# Patient Record
Sex: FEMALE | HISPANIC OR LATINO
[De-identification: names, ages, dates, MRNs, and addresses within clinical notes are randomized per-mention and may not be internally consistent; named-entity substitution may affect disease eponyms.]

---

## 2017-07-11 ENCOUNTER — CHARTING TRANS (OUTPATIENT)
Dept: OTHER | Age: 37
End: 2017-07-11

## 2017-07-11 ENCOUNTER — LAB SERVICES (OUTPATIENT)
Dept: OTHER | Age: 37
End: 2017-07-11

## 2017-07-11 LAB — RAPID STREP GROUP A: NORMAL

## 2017-09-15 ENCOUNTER — CHARTING TRANS (OUTPATIENT)
Dept: OTHER | Age: 37
End: 2017-09-15

## 2017-10-30 ENCOUNTER — LAB SERVICES (OUTPATIENT)
Dept: OTHER | Age: 37
End: 2017-10-30

## 2017-10-30 ENCOUNTER — CHARTING TRANS (OUTPATIENT)
Dept: OTHER | Age: 37
End: 2017-10-30

## 2017-10-30 LAB
APPEARANCE: NORMAL
BILIRUBIN: NORMAL
COLOR: YELLOW
GLUCOSE U: NORMAL
KETONES: NORMAL
LEUKOCYTE ESTERASE: NORMAL
NITRITE: NORMAL
OCCULT BLOOD: NORMAL
PH: 5.5
PROTEIN: NORMAL
URINE SPEC GRAVITY: 1.02
UROBILINOGEN: 0.2

## 2017-10-30 ASSESSMENT — PAIN SCALES - GENERAL: PAINLEVEL_OUTOF10: 5

## 2017-11-24 ENCOUNTER — HOSPITAL (OUTPATIENT)
Dept: OTHER | Age: 37
End: 2017-11-24
Attending: FAMILY MEDICINE

## 2018-01-15 ENCOUNTER — CHARTING TRANS (OUTPATIENT)
Dept: OTHER | Age: 38
End: 2018-01-15

## 2018-01-15 ENCOUNTER — LAB SERVICES (OUTPATIENT)
Dept: OTHER | Age: 38
End: 2018-01-15

## 2018-01-15 ENCOUNTER — MYAURORA ACCOUNT LINK (OUTPATIENT)
Dept: OTHER | Age: 38
End: 2018-01-15

## 2018-01-16 LAB
APPEARANCE: CLEAR
BILIRUBIN: NORMAL
COLOR: YELLOW
GLUCOSE U: NORMAL
KETONES: NORMAL
LEUKOCYTE ESTERASE: NORMAL
NITRITE: NORMAL
OCCULT BLOOD: NORMAL
PH: 6
PROTEIN: NORMAL
URINE SPEC GRAVITY: 1.02
UROBILINOGEN: 1

## 2018-01-23 ENCOUNTER — CHARTING TRANS (OUTPATIENT)
Dept: OTHER | Age: 38
End: 2018-01-23

## 2018-01-23 LAB — PAP WITH HIGH RISK HPV: NORMAL

## 2018-11-02 VITALS
DIASTOLIC BLOOD PRESSURE: 80 MMHG | RESPIRATION RATE: 16 BRPM | OXYGEN SATURATION: 98 % | HEART RATE: 88 BPM | TEMPERATURE: 98.9 F | SYSTOLIC BLOOD PRESSURE: 130 MMHG | WEIGHT: 256.52 LBS

## 2018-11-02 VITALS
SYSTOLIC BLOOD PRESSURE: 133 MMHG | DIASTOLIC BLOOD PRESSURE: 92 MMHG | OXYGEN SATURATION: 100 % | BODY MASS INDEX: 41.29 KG/M2 | WEIGHT: 233 LBS | TEMPERATURE: 99.1 F | HEIGHT: 63 IN | RESPIRATION RATE: 16 BRPM | HEART RATE: 84 BPM

## 2018-11-02 VITALS
HEIGHT: 63 IN | RESPIRATION RATE: 17 BRPM | WEIGHT: 248 LBS | BODY MASS INDEX: 43.94 KG/M2 | HEART RATE: 74 BPM | TEMPERATURE: 98.2 F

## 2018-11-03 VITALS
DIASTOLIC BLOOD PRESSURE: 82 MMHG | RESPIRATION RATE: 18 BRPM | SYSTOLIC BLOOD PRESSURE: 110 MMHG | TEMPERATURE: 98.2 F | HEART RATE: 77 BPM | WEIGHT: 250 LBS | HEIGHT: 63 IN | BODY MASS INDEX: 44.3 KG/M2 | OXYGEN SATURATION: 98 %

## 2019-04-03 ENCOUNTER — WALK IN (OUTPATIENT)
Dept: URGENT CARE | Age: 39
End: 2019-04-03

## 2019-04-03 DIAGNOSIS — H69.93 DYSFUNCTION OF BOTH EUSTACHIAN TUBES: Primary | ICD-10-CM

## 2019-04-03 PROCEDURE — 99213 OFFICE O/P EST LOW 20 MIN: CPT | Performed by: NURSE PRACTITIONER

## 2019-04-03 ASSESSMENT — ENCOUNTER SYMPTOMS
EYE DISCHARGE: 1
FEVER: 0
SORE THROAT: 0
COUGH: 1
EYE ITCHING: 1
EYE REDNESS: 0
RHINORRHEA: 1

## 2019-04-03 ASSESSMENT — PAIN SCALES - GENERAL: PAINLEVEL: 5-6

## 2020-03-22 ENCOUNTER — TELEPHONE (OUTPATIENT)
Dept: FAMILY MEDICINE | Age: 40
End: 2020-03-22

## 2020-03-22 ENCOUNTER — TELEPHONE (OUTPATIENT)
Dept: SCHEDULING | Age: 40
End: 2020-03-22

## 2021-05-26 VITALS
SYSTOLIC BLOOD PRESSURE: 122 MMHG | HEART RATE: 93 BPM | TEMPERATURE: 98.6 F | DIASTOLIC BLOOD PRESSURE: 86 MMHG | HEIGHT: 63 IN | WEIGHT: 239 LBS | RESPIRATION RATE: 16 BRPM | BODY MASS INDEX: 42.35 KG/M2 | OXYGEN SATURATION: 98 %

## 2022-07-08 ENCOUNTER — NURSE ONLY (OUTPATIENT)
Dept: INTERNAL MEDICINE CLINIC | Facility: HOSPITAL | Age: 42
End: 2022-07-08
Attending: EMERGENCY MEDICINE

## 2022-07-08 DIAGNOSIS — Z00.00 WELLNESS EXAMINATION: Primary | ICD-10-CM

## 2022-07-08 LAB
RUBV IGG SER QL: POSITIVE
RUBV IGG SER-ACNC: 151.6 IU/ML (ref 10–?)

## 2022-07-08 PROCEDURE — 86765 RUBEOLA ANTIBODY: CPT

## 2022-07-08 PROCEDURE — 86480 TB TEST CELL IMMUN MEASURE: CPT

## 2022-07-08 PROCEDURE — 86762 RUBELLA ANTIBODY: CPT

## 2022-07-08 PROCEDURE — 86787 VARICELLA-ZOSTER ANTIBODY: CPT

## 2022-07-08 PROCEDURE — 86735 MUMPS ANTIBODY: CPT

## 2022-07-11 LAB
M TB IFN-G CD4+ T-CELLS BLD-ACNC: 0.71 IU/ML
M TB TUBERC IFN-G BLD QL: NEGATIVE
M TB TUBERC IGNF/MITOGEN IGNF CONTROL: >10 IU/ML
MEV IGG SER-ACNC: 149 AU/ML (ref 16.5–?)
MUV IGG SER IA-ACNC: 16.1 AU/ML (ref 11–?)
QFT TB1 AG MINUS NIL: -0.45 IU/ML
QFT TB2 AG MINUS NIL: -0.45 IU/ML
VZV IGG SER IA-ACNC: 1420 (ref 165–?)

## 2022-09-19 ENCOUNTER — HOSPITAL ENCOUNTER (OUTPATIENT)
Dept: MAMMOGRAPHY | Facility: HOSPITAL | Age: 42
Discharge: HOME | End: 2022-09-19

## 2022-09-19 DIAGNOSIS — Z12.39 ENCOUNTER FOR OTHER SCREENING FOR MALIGNANT NEOPLASM OF BREAST: ICD-10-CM

## 2022-09-19 DIAGNOSIS — Z12.31 ENCOUNTER FOR SCREENING MAMMOGRAM FOR MALIGNANT NEOPLASM OF BREAST: ICD-10-CM

## 2022-09-19 PROCEDURE — 77067 SCR MAMMO BI INCL CAD: CPT | Performed by: FAMILY MEDICINE

## 2022-09-19 PROCEDURE — 77063 BREAST TOMOSYNTHESIS BI: CPT | Performed by: FAMILY MEDICINE

## 2022-10-24 ENCOUNTER — OFFICE VISIT (OUTPATIENT)
Dept: FAMILY MEDICINE CLINIC | Facility: CLINIC | Age: 42
End: 2022-10-24
Payer: COMMERCIAL

## 2022-10-24 VITALS
WEIGHT: 252 LBS | SYSTOLIC BLOOD PRESSURE: 128 MMHG | DIASTOLIC BLOOD PRESSURE: 88 MMHG | HEART RATE: 83 BPM | BODY MASS INDEX: 43.02 KG/M2 | TEMPERATURE: 98 F | HEIGHT: 64 IN | OXYGEN SATURATION: 97 % | RESPIRATION RATE: 16 BRPM

## 2022-10-24 DIAGNOSIS — J06.9 VIRAL URI: Primary | ICD-10-CM

## 2022-10-24 DIAGNOSIS — H65.02 NON-RECURRENT ACUTE SEROUS OTITIS MEDIA OF LEFT EAR: ICD-10-CM

## 2022-10-24 LAB
POCT LOT NUMBER: NORMAL
RAPID SARS-COV-2 BY PCR: NOT DETECTED

## 2022-10-24 RX ORDER — LIRAGLUTIDE 6 MG/ML
INJECTION, SOLUTION SUBCUTANEOUS
COMMUNITY
Start: 2022-09-06

## 2022-11-04 ENCOUNTER — IMMUNIZATION (OUTPATIENT)
Dept: LAB | Facility: HOSPITAL | Age: 42
End: 2022-11-04
Attending: PREVENTIVE MEDICINE
Payer: COMMERCIAL

## 2022-11-04 DIAGNOSIS — Z23 NEED FOR VACCINATION: Primary | ICD-10-CM

## 2022-11-04 PROCEDURE — 0124A SARSCOV2 VAC BVL 30MCG/0.3ML: CPT

## 2022-11-04 PROCEDURE — 90471 IMMUNIZATION ADMIN: CPT

## 2023-06-26 DIAGNOSIS — G56.03 BILATERAL CARPAL TUNNEL SYNDROME: Primary | ICD-10-CM

## 2024-01-09 ENCOUNTER — E-ADVICE (OUTPATIENT)
Dept: OTHER | Age: 44
End: 2024-01-09

## 2024-03-26 ENCOUNTER — HOSPITAL ENCOUNTER (OUTPATIENT)
Age: 44
Discharge: HOME | End: 2024-03-26
Payer: COMMERCIAL

## 2024-03-26 ENCOUNTER — APPOINTMENT (OUTPATIENT)
Dept: GENERAL RADIOLOGY | Age: 44
End: 2024-03-26
Attending: NURSE PRACTITIONER
Payer: COMMERCIAL

## 2024-03-26 VITALS
TEMPERATURE: 98 F | SYSTOLIC BLOOD PRESSURE: 144 MMHG | DIASTOLIC BLOOD PRESSURE: 89 MMHG | HEART RATE: 80 BPM | RESPIRATION RATE: 14 BRPM | OXYGEN SATURATION: 100 %

## 2024-03-26 DIAGNOSIS — J98.01 BRONCHOSPASM: ICD-10-CM

## 2024-03-26 DIAGNOSIS — B97.89 VIRAL RESPIRATORY ILLNESS: Primary | ICD-10-CM

## 2024-03-26 DIAGNOSIS — J98.8 VIRAL RESPIRATORY ILLNESS: Primary | ICD-10-CM

## 2024-03-26 LAB
POCT INFLUENZA A: NEGATIVE
POCT INFLUENZA B: NEGATIVE
SARS-COV-2 RNA RESP QL NAA+PROBE: NOT DETECTED

## 2024-03-26 PROCEDURE — 87502 INFLUENZA DNA AMP PROBE: CPT | Performed by: NURSE PRACTITIONER

## 2024-03-26 PROCEDURE — 99204 OFFICE O/P NEW MOD 45 MIN: CPT | Performed by: NURSE PRACTITIONER

## 2024-03-26 PROCEDURE — 71046 X-RAY EXAM CHEST 2 VIEWS: CPT | Performed by: NURSE PRACTITIONER

## 2024-03-26 PROCEDURE — U0002 COVID-19 LAB TEST NON-CDC: HCPCS | Performed by: NURSE PRACTITIONER

## 2024-03-26 RX ORDER — BENZONATATE 100 MG/1
100 CAPSULE ORAL 3 TIMES DAILY PRN
Qty: 20 CAPSULE | Refills: 0 | Status: SHIPPED | OUTPATIENT
Start: 2024-03-26 | End: 2024-04-02

## 2024-03-26 RX ORDER — PREDNISONE 20 MG/1
40 TABLET ORAL DAILY
Qty: 10 TABLET | Refills: 0 | Status: SHIPPED | OUTPATIENT
Start: 2024-03-26 | End: 2024-03-31

## 2024-03-26 RX ORDER — ALBUTEROL SULFATE 90 UG/1
2 AEROSOL, METERED RESPIRATORY (INHALATION) EVERY 4 HOURS PRN
Qty: 8.5 EACH | Refills: 0 | Status: SHIPPED | OUTPATIENT
Start: 2024-03-26 | End: 2024-04-25

## 2024-03-26 NOTE — ED INITIAL ASSESSMENT (HPI)
Pt c/o congestion, cough x6 days, L ear pressure, sore throat x5 days, pain to R lateral area of breast x4 days.  No falls or injury.  No fever.

## 2024-03-26 NOTE — ED PROVIDER NOTES
Patient Seen in: [facility]    History   CC: cough  HPI: Kaitlin Elizalde 43 year old female  who presents c/o cough, congestion, sore throat, ear pressure, fatigue x5 days. Denies fever, rash, fabio, cp, gi s/s, rash. Difficulty sleeping at night d/t cough and wheezing per pt.     History reviewed. No pertinent past medical history.    History reviewed. No pertinent surgical history.    No family history on file.    Social History     Socioeconomic History    Marital status: Single   Tobacco Use    Smoking status: Never    Smokeless tobacco: Never   Vaping Use    Vaping Use: Never used   Substance and Sexual Activity    Alcohol use: Yes     Comment: occ    Drug use: Never       ROS:  Review of Systems    Positive for stated complaint: cough  Other systems are as noted in HPI.  Constitutional and vital signs reviewed.      All other systems reviewed and negative except as noted above.    PSFH elements reviewed from today and agreed except as otherwise stated in HPI.             Constitutional and vital signs reviewed.        Physical Exam     ED Triage Vitals [03/26/24 0820]   /89   Pulse 80   Resp 14   Temp 98.4 °F (36.9 °C)   Temp src Oral   SpO2 100 %   O2 Device None (Room air)       Current:/89   Pulse 80   Temp 98.4 °F (36.9 °C) (Oral)   Resp 14   LMP 03/05/2024   SpO2 100%         PE:  General - Appears well, non-toxic and in NAD  Head - Appears symmetrical without deformity/swelling cranium, scalp, or facial bones  Eyes - sclera not injected, no discharge noted, no periorbital edema  ENT - EAC bilaterally without discharge, TM bilat without injection,    + clear nasal drainage noted in nares bilat, no cobblestoning to post. Pharynx. However serous otitis bilat present.  Oropharynx clear, posterior pharynx is without erythema and without tonsilar enlargement or exudate, uvula midline, +gag, voice is clear. No trismus  Neck - supple with trachea midline  Resp - Lung sounds clear  bilaterally and wob unlabored, good aeration with equal, even expansion bilaterally   CV - RRR  Skin - no rashes or petechiae noted, pink warm and dry throughout, mmm, cap refill <2seconds  Neuro - A&O x4, steady gait  MSK - makes purposeful movements of all extremities, radial pulses 2+ bilat.  Psych - Interactive and appropriate      ED Course     Labs Reviewed   POCT FLU TEST - Normal    Narrative:     This assay is a rapid molecular in vitro test utilizing nucleic acid amplification of influenza A and B viral RNA.   RAPID SARS-COV-2 BY PCR - Normal       MDM     XR CHEST PA + LAT CHEST (CPT=71046) (Final result)  Result time 03/26/24 08:51:32  Final result by Deon Larkin MD (03/26/24 08:51:32)                Impression:    CONCLUSION:  1. No acute cardiopulmonary disease           Dictated by (CST): Deon Larkin MD on 3/26/2024 at 8:46 AM      Finalized by (CST): Deon Larkin MD on 3/26/2024 at 8:51 AM                  Narrative:    PROCEDURE: XR CHEST PA + LAT CHEST (CPT=71046)     COMPARISON: None.     INDICATIONS: Productive cough, sore throat, right sided chest pain, and congestion x5 days.     TECHNIQUE:   Two views.       FINDINGS:  CARDIAC/VASC: Normal cardiac silhouette.  Unremarkable pulmonary vasculature.    MEDIAST/JOE: Tortuous aorta  LUNGS/PLEURA: No significant pulmonary parenchymal abnormalities.  No effusion or pleural thickening.    BONES: Mild scoliosis with mild degenerative disc disease and spondylosis.    OTHER: Negative.                   DDx: covid, flu, pneumonia, unspecified viral illness    Chest x-ray as noted above without acute process and independently reviewed by this provider.  Influenza negative.  Rapid COVID PCR negative.  Results discussed with patient as well as general viral illness instructions, rest, hydration instructions, Tylenol or Motrin as needed for discomfort, prescriptions as written as well as indications/precautions on use reviewed,  follow-up and return/ED precautions reviewed.  Bronchospasm instructions reviewed.  Patient is historian and demonstrates understanding of all instruction and agrees with plan of care.      Disposition and Plan     Clinical Impression:  1. Viral respiratory illness    2. Bronchospasm        Disposition:  Discharge    Follow-up:  John Myers MD  [address] [place] [id number]  [phone]    Schedule an appointment as soon as possible for a visit in 2 days        Medications Prescribed:  Current Discharge Medication List        START taking these medications    Details   albuterol 108 (90 Base) MCG/ACT Inhalation Aero Soln Inhale 2 puffs into the lungs every 4 (four) hours as needed for Wheezing or Shortness of Breath (spastic cough).  Qty: 8.5 each, Refills: 0      Spacer/Aero-Holding Chambers Does not apply Device Spacer for use with Albuterol Inhaler  Qty: 1 each, Refills: 0      predniSONE 20 MG Oral Tab Take 2 tablets (40 mg total) by mouth daily for 5 days.  Qty: 10 tablet, Refills: 0      benzonatate 100 MG Oral Cap Take 1 capsule (100 mg total) by mouth 3 (three) times daily as needed for cough.  Qty: 20 capsule, Refills: 0

## 2024-07-24 ENCOUNTER — TELEPHONE (OUTPATIENT)
Dept: PODIATRY CLINIC | Facility: CLINIC | Age: 44
End: 2024-07-24

## 2024-07-24 NOTE — TELEPHONE ENCOUNTER
Called patient and offered her appointment on 7/26 at 230pm with Dr Prather at [facility] for left foot pain. She had no further concerns.

## 2024-07-24 NOTE — TELEPHONE ENCOUNTER
Patient calling wanting to schedule with Dr. Prather for foot pain. Patient is aware that Dr. Prather will be out on maternity leave soon and is not taking anymore surgical cases at this time. Patient expressed verbal understanding.   Dr. Prather has given the okay to schedule her into her schedule. Please call and schedule appointment.

## 2024-07-26 ENCOUNTER — PATIENT MESSAGE (OUTPATIENT)
Dept: PODIATRY CLINIC | Facility: CLINIC | Age: 44
End: 2024-07-26

## 2024-07-26 ENCOUNTER — TELEPHONE (OUTPATIENT)
Dept: PODIATRY CLINIC | Facility: CLINIC | Age: 44
End: 2024-07-26

## 2024-07-26 ENCOUNTER — OFFICE VISIT (OUTPATIENT)
Dept: PODIATRY CLINIC | Facility: CLINIC | Age: 44
End: 2024-07-26
Payer: COMMERCIAL

## 2024-07-26 DIAGNOSIS — M76.822 POSTERIOR TIBIAL TENDINITIS, LEFT: Primary | ICD-10-CM

## 2024-07-26 PROCEDURE — 99203 OFFICE O/P NEW LOW 30 MIN: CPT | Performed by: STUDENT IN AN ORGANIZED HEALTH CARE EDUCATION/TRAINING PROGRAM

## 2024-07-26 NOTE — TELEPHONE ENCOUNTER
Called patient and appointment scheduled on 8/27 at 1pm at [facility] with Dr Prather.

## 2024-07-26 NOTE — PROGRESS NOTES
[facility] Podiatry  Progress Note      Kaitlin Elizalde is a 44 year old female.   Chief Complaint   Patient presents with    Foot Pain     Left - onset 1.5 mo ago - no injury - she just felt a piercing pain - pain got worse with dancing last week - no x-rays - rates pain as 8/10 5-8/10 on and off - has pain in the heel towards her arch          HPI:     Patient is a pleasant 44-year-old female presents to clinic for evaluation of left midfoot pain.  Admits that the pain is sharp in nature.  Relates that the pain got worse after she went dancing last week.  She does not walk barefoot and she uses supportive shoes.  Denies any other pedal injuries or trauma.    Allergies: Patient has no known allergies.    Current Outpatient Medications   Medication Sig Dispense Refill    Spacer/Aero-Holding Chambers Does not apply Device Spacer for use with Albuterol Inhaler 1 each 0    SAXENDA 18 MG/3ML Subcutaneous Solution Pen-injector inject 0.5/half mL below the skin daily (Patient not taking: Reported on 3/26/2024)        History reviewed. No pertinent past medical history.   History reviewed. No pertinent surgical history.   History reviewed. No pertinent family history.   Social History     Socioeconomic History    Marital status: Single   Tobacco Use    Smoking status: Never    Smokeless tobacco: Never   Vaping Use    Vaping status: Never Used   Substance and Sexual Activity    Alcohol use: Yes     Comment: occ    Drug use: Never           REVIEW OF SYSTEMS:     Denies nause, fever, chills  No calf pain  Denies chest pain or SOB      EXAM:   [facility] 03/05/2024   GENERAL: well developed, well nourished, in no apparent distress  EXTREMITIES:   1. Integument: Normal skin temperature and turgor  2. Vascular: Dorsalis pedis two out of four bilateral and posterior tibial pulses two out of   four bilateral, capillary refill normal.   3. Musculoskeletal: All muscle groups are graded 5 out of 5 in the foot and ankle.  Pain with left  single heel rise.  Tenderness with palpation along the posterior tibial tendon insertion   4. Neurological: Normal sharp dull sensation; reflexes normal.             ASSESSMENT AND PLAN:   Diagnoses and all orders for this visit:    Posterior tibial tendinitis, left        Plan:     Patient seen and examined and findings discussed with patient.  Informed patient that she is experiencing posterior tibial tendinitis.  I recommend using a frozen water bottle to roll on the plantar aspect of the foot while seated.  Take oral Medrol Dosepak as instructed.  Use supportive shoes even around the house and avoid walking barefoot.  Return to clinic for follow-up in 4 weeks.    The patient indicates understanding of these issues and agrees to the plan.        Patricia Prather DPM

## 2024-07-26 NOTE — TELEPHONE ENCOUNTER
Pt saw Dr. Prather on 7/26/24. Needs a follow up in 4 weeks. Please advise with appt as scheduled is blocked

## 2024-07-29 NOTE — TELEPHONE ENCOUNTER
Note from 7/26/24 states that she should take oral medrol as instructed. No medication placed for patient.    Please advise on Rx for patient

## 2024-07-29 NOTE — TELEPHONE ENCOUNTER
From: Kaitlin Elizalde  To: Patricia Prather  Sent: 7/26/2024 4:06 PM CDT  Subject: Prescription     Hello! I wasn't sure if I was supposed to obtain an actual prescription or if it would be ordered? My pharmacy has no record of a prescription being sent.    Thanks,  Kaitlin

## 2024-07-30 RX ORDER — METHYLPREDNISOLONE 4 MG/1
TABLET ORAL
Qty: 21 TABLET | Refills: 0 | Status: SHIPPED | OUTPATIENT
Start: 2024-07-30

## 2024-08-30 ENCOUNTER — OFFICE VISIT (OUTPATIENT)
Dept: INTERNAL MEDICINE CLINIC | Facility: CLINIC | Age: 44
End: 2024-08-30
Payer: COMMERCIAL

## 2024-08-30 VITALS
OXYGEN SATURATION: 94 % | HEIGHT: 64 IN | BODY MASS INDEX: 45.41 KG/M2 | HEART RATE: 86 BPM | SYSTOLIC BLOOD PRESSURE: 122 MMHG | DIASTOLIC BLOOD PRESSURE: 94 MMHG | WEIGHT: 266 LBS

## 2024-08-30 DIAGNOSIS — Z12.31 SCREENING MAMMOGRAM FOR BREAST CANCER: ICD-10-CM

## 2024-08-30 DIAGNOSIS — Z00.00 ROUTINE GENERAL MEDICAL EXAMINATION AT A HEALTH CARE FACILITY: Primary | ICD-10-CM

## 2024-08-30 PROBLEM — N80.9 ENDOMETRIOSIS: Status: ACTIVE | Noted: 2024-08-30

## 2024-08-30 PROBLEM — E66.9 OBESITY: Status: ACTIVE | Noted: 2024-08-30

## 2024-08-30 RX ORDER — TIRZEPATIDE 2.5 MG/.5ML
2.5 INJECTION, SOLUTION SUBCUTANEOUS WEEKLY
Qty: 2 ML | Refills: 0 | Status: SHIPPED | OUTPATIENT
Start: 2024-08-30 | End: 2024-09-21

## 2024-08-30 NOTE — PROGRESS NOTES
Subjective:   Kaitlin Elizalde is a 44 year old female who presents for Establish Care and Physical     Presents for new patient physical       PMHx  Endometriosis - had surgery at age 20 and had an ovarian cyst      has a 14 year old son and was pre-diabetic after the birth     Has gained a lot of weight in the past few years and joints are hurting   Previous doctor had her on saxenda for weight loss but then insurance stopped covering it - it was working and was losing some weight   Son has a full day of activities so she has a hard time being able to exercise   Trying to walk more - at work - 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[place] so does spend most of her day at a desk but also walks during the day  Does not eat fast food but grabs a granola bar  Usually home cooked meals for dinner   Trying to avoid sweets     No smoking or drug use   Alcohol about once a week - selter vodka drinks about a 12 pack at a time  Has been cutting down on this     Takes multivitamin    Periods are irregular   Has cycle for 7 days and then just spots the next month   Has severe cramping and heavy bleeding the first day or two   Has never been anemic with menstrual cycles     Thinks she had her pap in  which was normal    Mammogram 2 years ago was normal     Not sure when her last tetanus shot was done     History/Other:    Chief Complaint Reviewed and Verified  No Further Nursing Notes to   Review  Tobacco Reviewed  Allergies Reviewed  Medications Reviewed    Problem List Reviewed  Medical History Reviewed  Surgical History   Reviewed  OB Status Reviewed  Family History Reviewed         Tobacco:  She has never smoked tobacco.    Current Outpatient Medications   Medication Sig Dispense Refill    Tirzepatide-Weight Management (ZEPBOUND) 2.5 MG/0.5ML Subcutaneous Solution Auto-injector Inject 2.5 mg into the skin once a week for 4 doses. 2 mL 0         Review of Systems:  Review of Systems  10 point review of  systems otherwise negative with the exception of HPI and assessment and plan    Objective:   BP (!) 122/94   Pulse 86   Ht 5' 4\" (1.626 m)   Wt 266 lb (120.7 kg)   LMP 08/13/2024   SpO2 94%   BMI 45.66 kg/m²  Estimated body mass index is 45.66 kg/m² as calculated from the following:    Height as of this encounter: 5' 4\" (1.626 m).    Weight as of this encounter: 266 lb (120.7 kg).  Physical Exam  Vitals reviewed.   Constitutional:       General: She is not in acute distress.     Appearance: Normal appearance. She is well-developed. She is obese.   HENT:      Right Ear: Tympanic membrane normal.      Left Ear: Tympanic membrane normal.      Mouth/Throat:      Mouth: Mucous membranes are moist.      Pharynx: Oropharynx is clear.   Cardiovascular:      Rate and Rhythm: Normal rate and regular rhythm.      Heart sounds: Normal heart sounds.   Pulmonary:      Effort: Pulmonary effort is normal.      Breath sounds: Normal breath sounds.   Abdominal:      General: Bowel sounds are normal.      Palpations: Abdomen is soft.   Lymphadenopathy:      Cervical: No cervical adenopathy.   Skin:     General: Skin is warm and dry.   Neurological:      Mental Status: She is alert and oriented to person, place, and time.       Assessment & Plan:   1. Routine general medical examination at a health care facility (Primary)  -     CBC With Differential With Platelet; Future; Expected date: 08/30/2024  -     Comp Metabolic Panel (14); Future; Expected date: 08/30/2024  -     Lipid Panel; Future; Expected date: 08/30/2024  -     TSH W Reflex To Free T4; Future; Expected date: 08/30/2024  2. Screening mammogram for breast cancer  -     [facility] MARIEL 2D+3D SCREENING BILAT (CPT=77067/40574); Future; Expected date: 08/30/2024  3. BMI 45.0-49.9, adult (HCC)  -     Zepbound; Inject 2.5 mg into the skin once a week for 4 doses.  Dispense: 2 mL; Refill: 0  Discussed pros and cons of different weight loss medications. She is interested in  tirzepatide (zepbound). Discussed potential side effects. She denies personal or family history of medullary thyroid carcinoma. Will send medication and she will follow up in 4 weeks.      Will sign a release of information to obtain her records       FABBY Monet, 8/30/2024, 4:32 PM

## 2024-09-03 ENCOUNTER — PATIENT MESSAGE (OUTPATIENT)
Dept: INTERNAL MEDICINE CLINIC | Facility: CLINIC | Age: 44
End: 2024-09-03

## 2024-09-03 ENCOUNTER — LAB ENCOUNTER (OUTPATIENT)
Dept: LAB | Facility: HOSPITAL | Age: 44
End: 2024-09-03
Payer: COMMERCIAL

## 2024-09-03 DIAGNOSIS — Z00.00 PHYSICAL EXAM, ANNUAL: Primary | ICD-10-CM

## 2024-09-03 DIAGNOSIS — Z00.00 PHYSICAL EXAM, ANNUAL: ICD-10-CM

## 2024-09-03 DIAGNOSIS — Z00.00 ROUTINE GENERAL MEDICAL EXAMINATION AT A HEALTH CARE FACILITY: ICD-10-CM

## 2024-09-03 DIAGNOSIS — R73.01 ELEVATED FASTING BLOOD SUGAR: ICD-10-CM

## 2024-09-03 LAB
ALBUMIN SERPL-MCNC: 4.6 G/DL (ref 3.2–4.8)
ALBUMIN/GLOB SERPL: 1.5 {RATIO} (ref 1–2)
ALP LIVER SERPL-CCNC: 60 U/L
ALT SERPL-CCNC: 11 U/L
ANION GAP SERPL CALC-SCNC: 6 MMOL/L (ref 0–18)
AST SERPL-CCNC: 11 U/L (ref ?–34)
BASOPHILS # BLD AUTO: 0.03 X10(3) UL (ref 0–0.2)
BASOPHILS NFR BLD AUTO: 0.2 %
BILIRUB SERPL-MCNC: 1.1 MG/DL (ref 0.3–1.2)
BUN BLD-MCNC: 15 MG/DL (ref 9–23)
BUN/CREAT SERPL: 21.4 (ref 10–20)
CALCIUM BLD-MCNC: 9.6 MG/DL (ref 8.7–10.4)
CHLORIDE SERPL-SCNC: 110 MMOL/L (ref 98–112)
CHOLEST SERPL-MCNC: 276 MG/DL (ref ?–200)
CO2 SERPL-SCNC: 26 MMOL/L (ref 21–32)
CREAT BLD-MCNC: 0.7 MG/DL
DEPRECATED RDW RBC AUTO: 43.4 FL (ref 35.1–46.3)
EGFRCR SERPLBLD CKD-EPI 2021: 111 ML/MIN/1.73M2 (ref 60–?)
EOSINOPHIL # BLD AUTO: 0 X10(3) UL (ref 0–0.7)
EOSINOPHIL NFR BLD AUTO: 0 %
ERYTHROCYTE [DISTWIDTH] IN BLOOD BY AUTOMATED COUNT: 12.9 % (ref 11–15)
EST. AVERAGE GLUCOSE BLD GHB EST-MCNC: 114 MG/DL (ref 68–126)
FASTING PATIENT LIPID ANSWER: YES
FASTING STATUS PATIENT QL REPORTED: YES
GLOBULIN PLAS-MCNC: 3 G/DL (ref 2–3.5)
GLUCOSE BLD-MCNC: 106 MG/DL (ref 70–99)
HBA1C MFR BLD: 5.6 % (ref ?–5.7)
HCT VFR BLD AUTO: 43 %
HDLC SERPL-MCNC: 58 MG/DL (ref 40–59)
HGB BLD-MCNC: 14.3 G/DL
IMM GRANULOCYTES # BLD AUTO: 0.06 X10(3) UL (ref 0–1)
IMM GRANULOCYTES NFR BLD: 0.5 %
LDLC SERPL CALC-MCNC: 175 MG/DL (ref ?–100)
LYMPHOCYTES # BLD AUTO: 3.28 X10(3) UL (ref 1–4)
LYMPHOCYTES NFR BLD AUTO: 26.9 %
MCH RBC QN AUTO: 30.3 PG (ref 26–34)
MCHC RBC AUTO-ENTMCNC: 33.3 G/DL (ref 31–37)
MCV RBC AUTO: 91.1 FL
MONOCYTES # BLD AUTO: 0.66 X10(3) UL (ref 0.1–1)
MONOCYTES NFR BLD AUTO: 5.4 %
NEUTROPHILS # BLD AUTO: 8.18 X10 (3) UL (ref 1.5–7.7)
NEUTROPHILS # BLD AUTO: 8.18 X10(3) UL (ref 1.5–7.7)
NEUTROPHILS NFR BLD AUTO: 67 %
NONHDLC SERPL-MCNC: 218 MG/DL (ref ?–130)
OSMOLALITY SERPL CALC.SUM OF ELEC: 295 MOSM/KG (ref 275–295)
PLATELET # BLD AUTO: 363 10(3)UL (ref 150–450)
POTASSIUM SERPL-SCNC: 4.3 MMOL/L (ref 3.5–5.1)
PROT SERPL-MCNC: 7.6 G/DL (ref 5.7–8.2)
RBC # BLD AUTO: 4.72 X10(6)UL
SODIUM SERPL-SCNC: 142 MMOL/L (ref 136–145)
TRIGL SERPL-MCNC: 228 MG/DL (ref 30–149)
TSI SER-ACNC: 1.04 MIU/ML (ref 0.55–4.78)
VLDLC SERPL CALC-MCNC: 47 MG/DL (ref 0–30)
WBC # BLD AUTO: 12.2 X10(3) UL (ref 4–11)

## 2024-09-03 PROCEDURE — 84443 ASSAY THYROID STIM HORMONE: CPT

## 2024-09-03 PROCEDURE — 36415 COLL VENOUS BLD VENIPUNCTURE: CPT

## 2024-09-03 PROCEDURE — 83036 HEMOGLOBIN GLYCOSYLATED A1C: CPT

## 2024-09-03 PROCEDURE — 85025 COMPLETE CBC W/AUTO DIFF WBC: CPT

## 2024-09-03 PROCEDURE — 80053 COMPREHEN METABOLIC PANEL: CPT

## 2024-09-03 PROCEDURE — 80061 LIPID PANEL: CPT

## 2024-09-03 NOTE — TELEPHONE ENCOUNTER
Patient called office. Date of birth and full name both confirmed. Regarding same question.       Melia SEQUEIRA / Dr York ([facility] and Collaborating physician)---- patient would like to avoid another Blood Draw    Based on CMP results, do you want to Add Hemoglobin A1c ?    Please advise.

## 2024-09-04 DIAGNOSIS — D72.820 LYMPHOCYTOSIS: ICD-10-CM

## 2024-09-04 DIAGNOSIS — E78.2 MIXED HYPERLIPIDEMIA: ICD-10-CM

## 2024-09-04 DIAGNOSIS — R73.01 ELEVATED FASTING BLOOD SUGAR: Primary | ICD-10-CM

## 2024-09-05 ENCOUNTER — PATIENT MESSAGE (OUTPATIENT)
Dept: INTERNAL MEDICINE CLINIC | Facility: CLINIC | Age: 44
End: 2024-09-05

## 2024-09-06 ENCOUNTER — TELEPHONE (OUTPATIENT)
Dept: INTERNAL MEDICINE CLINIC | Facility: CLINIC | Age: 44
End: 2024-09-06

## 2024-09-06 NOTE — TELEPHONE ENCOUNTER
Pharmacy requesting PA      Current Outpatient Medications:       Tirzepatide-Weight Management (ZEPBOUND) 2.5 MG/0.5ML Subcutaneous Solution Auto-injector, Inject 2.5 mg into the skin once a week for 4 doses., Disp: 2 mL, Rfl: 0,    PATIENT ID   [id number]

## 2024-09-06 NOTE — TELEPHONE ENCOUNTER
Approved    Prior authorization approved  Payer: [employer] Case ID: [id number]    [phone]    [phone]  Note from payer: CaseId:[id number];Status:Approved;Review Type:Prior Auth;Coverage Start Date:08/07/2024;Coverage End Date:05/04/2025;  Approval Details    Authorized from August 7, 2024 to May 4, 2025  Electronic appeal: Not supported  View History

## 2024-09-06 NOTE — TELEPHONE ENCOUNTER
From: Kaitlin Elizalde  To: Melia Singer  Sent: 9/5/2024 8:07 AM CDT  Subject: Prescription     Hello!   Just wondering if there was status on approval for my prescription pending insurance coverage?  Thanks!

## 2024-09-09 NOTE — TELEPHONE ENCOUNTER
[facility] contacted. Staff states patient had medication transferred to [facility] pharmacy.

## 2024-09-09 NOTE — TELEPHONE ENCOUNTER
Patient verified Zepbound transferred to [facility] pharmacy. No further action needed at this time.

## 2024-09-30 ENCOUNTER — OFFICE VISIT (OUTPATIENT)
Dept: INTERNAL MEDICINE CLINIC | Facility: CLINIC | Age: 44
End: 2024-09-30
Payer: COMMERCIAL

## 2024-09-30 VITALS
HEART RATE: 76 BPM | BODY MASS INDEX: 44.73 KG/M2 | OXYGEN SATURATION: 99 % | SYSTOLIC BLOOD PRESSURE: 129 MMHG | DIASTOLIC BLOOD PRESSURE: 81 MMHG | WEIGHT: 262 LBS | HEIGHT: 64 IN

## 2024-09-30 RX ORDER — TIRZEPATIDE 2.5 MG/.5ML
2.5 INJECTION, SOLUTION SUBCUTANEOUS
COMMUNITY
Start: 2024-09-06 | End: 2024-09-30

## 2024-09-30 RX ORDER — TIRZEPATIDE 5 MG/.5ML
5 INJECTION, SOLUTION SUBCUTANEOUS WEEKLY
Qty: 2 ML | Refills: 0 | Status: SHIPPED | OUTPATIENT
Start: 2024-09-30 | End: 2024-10-22

## 2024-09-30 NOTE — PROGRESS NOTES
Subjective:   Kaitlin Elizalde is a 44 year old female who presents for Follow - Up     Presents for 1-month follow up on zepbound   5lb weight loss since starting medication   No side effects, no nausea or constipation   Helping curb cravings    Trying to eat 3 times a day but has at least 2 consistent meals  Trying to grab healthier options, often is grabbing a snack on her way out the door, is avoiding chips or other unhealthy snacks  Walking for exercise, talking the stairs at work   Doing a few days of strength training - body weight exercises  Recently got a part time job at [employer] so this includes a lot of walking as well   Would like to go up on the dose     History/Other:    Chief Complaint Reviewed and Verified  No Further Nursing Notes to   Review  Tobacco Reviewed  Allergies Reviewed  Medications Reviewed         Tobacco:  She has never smoked tobacco.    Current Outpatient Medications   Medication Sig Dispense Refill    Tirzepatide-Weight Management (ZEPBOUND) 5 MG/0.5ML Subcutaneous Solution Auto-injector Inject 5 mg into the skin once a week for 4 doses. 2 mL 0     Review of Systems:  Review of Systems  10 point review of systems otherwise negative with the exception of HPI and assessment and plan    Objective:   /81   Pulse 76   Ht 5' 4\" (1.626 m)   Wt 262 lb (118.8 kg)   LMP 08/13/2024   SpO2 99%   BMI 44.97 kg/m²  Estimated body mass index is 44.97 kg/m² as calculated from the following:    Height as of this encounter: 5' 4\" (1.626 m).    Weight as of this encounter: 262 lb (118.8 kg).  Physical Exam  Vitals reviewed.   Constitutional:       General: She is not in acute distress.     Appearance: Normal appearance. She is well-developed.   Cardiovascular:      Rate and Rhythm: Normal rate and regular rhythm.      Heart sounds: Normal heart sounds.   Pulmonary:      Effort: Pulmonary effort is normal.      Breath sounds: Normal breath sounds.   Skin:     General: Skin is warm  and dry.   Neurological:      Mental Status: She is alert and oriented to person, place, and time.       Assessment & Plan:   1. BMI 45.0-49.9, adult (HCC) (Primary)  -     Zepbound; Inject 5 mg into the skin once a week for 4 doses.  Dispense: 2 mL; Refill: 0  Tolerating well   Increase to 5mg and follow up in 1 month     FABBY Monet, 9/30/2024, 4:34 PM

## 2024-10-28 ENCOUNTER — HOSPITAL ENCOUNTER (OUTPATIENT)
Dept: MAMMOGRAPHY | Facility: HOSPITAL | Age: 44
Discharge: HOME | End: 2024-10-28
Payer: COMMERCIAL

## 2024-10-28 DIAGNOSIS — Z12.31 SCREENING MAMMOGRAM FOR BREAST CANCER: ICD-10-CM

## 2024-10-28 PROCEDURE — 77067 SCR MAMMO BI INCL CAD: CPT

## 2024-10-28 PROCEDURE — 77063 BREAST TOMOSYNTHESIS BI: CPT

## 2024-10-30 ENCOUNTER — OFFICE VISIT (OUTPATIENT)
Dept: INTERNAL MEDICINE CLINIC | Facility: CLINIC | Age: 44
End: 2024-10-30
Payer: COMMERCIAL

## 2024-10-30 VITALS
DIASTOLIC BLOOD PRESSURE: 88 MMHG | WEIGHT: 259 LBS | SYSTOLIC BLOOD PRESSURE: 132 MMHG | HEIGHT: 64 IN | BODY MASS INDEX: 44.22 KG/M2 | OXYGEN SATURATION: 96 % | HEART RATE: 81 BPM

## 2024-10-30 PROCEDURE — 99214 OFFICE O/P EST MOD 30 MIN: CPT

## 2024-10-30 RX ORDER — TIRZEPATIDE 7.5 MG/.5ML
7.5 INJECTION, SOLUTION SUBCUTANEOUS WEEKLY
Qty: 2 ML | Refills: 0 | Status: SHIPPED | OUTPATIENT
Start: 2024-10-30 | End: 2024-11-21

## 2024-10-30 NOTE — PROGRESS NOTES
Subjective:   Kaitlin Elizalde is a 44 year old female who presents for Follow - Up     Presents for zepbound follow up  Starting weight 266lbs  Home scale was at 254lbs - lost 8lbs since last visit   Eating smaller portion sizes   Some foods cause heartburn - mostly spicy foods   No constipation or diarrhea going once a day and her normal is twice a day  No nausea   Still doing a few days of weight training and walking     Just got her mammogram done and would like to know the results     History/Other:    Chief Complaint Reviewed and Verified  No Further Nursing Notes to   Review  Tobacco Reviewed  Allergies Reviewed  Medications Reviewed    Problem List Reviewed  Medical History Reviewed  Surgical History   Reviewed  OB Status Reviewed  Family History Reviewed         Tobacco:  She has never smoked tobacco.    Current Outpatient Medications   Medication Sig Dispense Refill    Tirzepatide-Weight Management (ZEPBOUND) 7.5 MG/0.5ML Subcutaneous Solution Auto-injector Inject 7.5 mg into the skin once a week for 4 doses. 2 mL 0         Review of Systems:  Review of Systems  10 point review of systems otherwise negative with the exception of HPI and assessment and plan    Objective:   /88   Pulse 81   Ht 5' 4\" (1.626 m)   Wt 259 lb (117.5 kg)   LMP 10/11/2024 (Approximate)   SpO2 96%   BMI 44.46 kg/m²  Estimated body mass index is 44.46 kg/m² as calculated from the following:    Height as of this encounter: 5' 4\" (1.626 m).    Weight as of this encounter: 259 lb (117.5 kg).  Physical Exam  Vitals reviewed.   Constitutional:       General: She is not in acute distress.     Appearance: Normal appearance. She is well-developed.   Cardiovascular:      Rate and Rhythm: Normal rate and regular rhythm.      Heart sounds: Normal heart sounds.   Pulmonary:      Effort: Pulmonary effort is normal.      Breath sounds: Normal breath sounds.   Abdominal:      General: Bowel sounds are normal.      Palpations:  Abdomen is soft.   Skin:     General: Skin is warm and dry.   Neurological:      Mental Status: She is alert and oriented to person, place, and time.         Assessment & Plan:   1. BMI 45.0-49.9, adult (HCC) (Primary)  -     Zepbound; Inject 7.5 mg into the skin once a week for 4 doses.  Dispense: 2 mL; Refill: 0  Follow up in 1 month     FABBY Monet, 10/30/2024, 4:32 PM

## 2024-11-23 ENCOUNTER — HOSPITAL ENCOUNTER (OUTPATIENT)
Age: 44
Discharge: HOME | End: 2024-11-23

## 2024-11-23 VITALS
SYSTOLIC BLOOD PRESSURE: 144 MMHG | OXYGEN SATURATION: 99 % | RESPIRATION RATE: 18 BRPM | HEART RATE: 75 BPM | DIASTOLIC BLOOD PRESSURE: 83 MMHG | TEMPERATURE: 98 F

## 2024-11-23 DIAGNOSIS — H66.002 NON-RECURRENT ACUTE SUPPURATIVE OTITIS MEDIA OF LEFT EAR WITHOUT SPONTANEOUS RUPTURE OF TYMPANIC MEMBRANE: Primary | ICD-10-CM

## 2024-11-23 LAB — S PYO AG THROAT QL: NEGATIVE

## 2024-11-23 RX ORDER — TIRZEPATIDE 7.5 MG/.5ML
INJECTION, SOLUTION SUBCUTANEOUS
COMMUNITY
Start: 2024-10-30

## 2024-11-23 NOTE — ED INITIAL ASSESSMENT (HPI)
Pt here with complaints of sore throat , and left ear pain that began 2 days ago , pt denies any fevers or sob

## 2024-11-23 NOTE — DISCHARGE INSTRUCTIONS
As discussed, it does appear that you have a left ear infection with fluid trapped behind tympanic membrane.  I have prescribed antibiotics to be taken twice a day for 10 days.  Start today.  Take with food and water.  Please complete full course of antibiotics.  You may continue to take Tylenol every 4 hours for ear pain and headache.  I also recommend NSAIDs every 6-8 hours additional relief, these meds include: Motrin, Advil, Aleve, ibuprofen, aspirin.    If you have sleep somewhat elevated upright, this will help alleviate pressure to tympanic membrane.  Avoid getting water in the ear: No tub baths or swimming.  Please avoid use of Q-tips, home remedies, pouring or sticking anything into ear.    Follow-up with your primary care doctor if you do not have relief in 7 to 10 days.

## 2024-11-23 NOTE — ED PROVIDER NOTES
Patient Seen in: [facility]      History     Chief Complaint   Patient presents with    Sore Throat    Ear Problem Pain     Stated Complaint: Ear Pain, Sore Throat    Subjective: 44 year old female, past medical history of elevated blood pressure readings, present sot IC with c/o left ear pain since Thursday and sore throat. Patient also reports intermittent headache with the pain. She is taking Tylenol for headache and ear pain which is helping but states she doesn't feel as if medication is as effective as it once was. Denies fever, chills, body aches. No cough, congestion, runny nose. No recent travel or swimming. Aox4   The history is provided by the patient.             Objective:     Past Medical History:    Elevated blood pressure reading without diagnosis of hypertension              Past Surgical History:   Procedure Laterality Date    Hysteroscopy,with endometrial  2000    endometriosis surgery    Tonsillectomy  2008                Social History     Socioeconomic History    Marital status: Single   Tobacco Use    Smoking status: Never    Smokeless tobacco: Never   Vaping Use    Vaping status: Never Used   Substance and Sexual Activity    Alcohol use: Yes     Comment: occ    Drug use: Never              Review of Systems   Constitutional: Negative.  Negative for activity change, appetite change, chills, fatigue and fever.   HENT:  Positive for ear pain and sore throat. Negative for congestion, ear discharge, postnasal drip, rhinorrhea, sinus pressure, sinus pain and sneezing.    Respiratory: Negative.     Cardiovascular: Negative.    Gastrointestinal: Negative.    Musculoskeletal: Negative.    Skin: Negative.    Neurological:  Positive for headaches. Negative for dizziness, weakness and light-headedness.       Positive for stated complaint: Ear Pain, Sore Throat  Other systems are as noted in HPI.  Constitutional and vital signs reviewed.      All other systems reviewed and negative except  as noted above.    Physical Exam     ED Triage Vitals [11/23/24 0837]   /83   Pulse 75   Resp 18   Temp 98.2 °F (36.8 °C)   Temp src Oral   SpO2 99 %   O2 Device None (Room air)       Current Vitals:   Vital Signs  BP: 144/83  Pulse: 75  Resp: 18  Temp: 98.2 °F (36.8 °C)  Temp src: Oral    Oxygen Therapy  SpO2: 99 %  O2 Device: None (Room air)        Physical Exam  Constitutional:       General: She is not in acute distress.     Appearance: She is well-developed. She is not ill-appearing or toxic-appearing.   HENT:      Head: Normocephalic.      Jaw: There is normal jaw occlusion.      Right Ear: Tympanic membrane and ear canal normal.      Left Ear: A middle ear effusion is present. Tympanic membrane is erythematous.      Nose: No congestion or rhinorrhea.      Mouth/Throat:      Lips: Pink. No lesions.      Mouth: Mucous membranes are moist. No oral lesions.      Tongue: No lesions.      Palate: No lesions.      Pharynx: Uvula midline. Postnasal drip present. No pharyngeal swelling, oropharyngeal exudate or uvula swelling.   Eyes:      Conjunctiva/sclera: Conjunctivae normal.      Pupils: Pupils are equal, round, and reactive to light.   Cardiovascular:      Rate and Rhythm: Normal rate and regular rhythm.      Heart sounds: Normal heart sounds.   Pulmonary:      Effort: Pulmonary effort is normal. No respiratory distress.      Breath sounds: Normal breath sounds. No stridor. No wheezing, rhonchi or rales.   Chest:      Chest wall: No tenderness.   Musculoskeletal:      Cervical back: Normal range of motion.   Lymphadenopathy:      Cervical: No cervical adenopathy.   Skin:     General: Skin is warm.      Capillary Refill: Capillary refill takes less than 2 seconds.   Neurological:      General: No focal deficit present.      Mental Status: She is alert and oriented to person, place, and time.             ED Course     Labs Reviewed   POCT RAPID STREP - Normal                   MDM      Differentials  considered include: Strep pharyngitis, viral pharyngitis, acute otitis media, middle ear effusion, viral URI.    Patient is negative for strep throat.  Patient posterior pharynx evaluated with cobblestone postnasal drip appearance.  Although, patient declines feeling postnasal drip.  No tonsillar enlargement, edema, exudate.  Uvula midline and normal in size.  Mucous membranes are moist.  No intraoral lesions or petechiae.  No cervical lymphadenopathy.  Patient is tolerating her own secretions well without difficulty.  No excessive drooling, stridor, voice change.  No evidence of peritonsillar abscess.    Lungs are clear to auscultation.  No wheezing, crackles, rhonchi, coarse or diminished breath sounds.  No tachypnea, tachycardia, hypoxia.  Patient without cough, congestion, runny nose.  Afebrile.  Very low suspicion for pneumonia.    Left TM erythematous with positive effusion.    Likely acute otitis media with effusion.  Did discuss antibiotics with patient.  Twice a day for 10 days.  She is aware to sleep somewhat elevated and upright.  She should avoid getting water in the ear: No tub baths or swimming.  She may take Tylenol and Motrin for pain alleviation.    Patient verbalizes understanding agrees with plan of care.        Medical Decision Making      Disposition and Plan     Clinical Impression:  1. Non-recurrent acute suppurative otitis media of left ear without spontaneous rupture of tympanic membrane         Disposition:  Discharge  11/23/2024  8:56 am    Follow-up:  Velvet Luna MD  [address] [address]  [phone]      As needed          Medications Prescribed:  Discharge Medication List as of 11/23/2024  8:57 AM        START taking these medications    Details   amoxicillin clavulanate 875-125 MG Oral Tab Take 1 tablet by mouth 2 (two) times daily for 10 days., Normal, Disp-20 tablet, R-0                 Supplementary Documentation:

## 2024-11-27 ENCOUNTER — TELEPHONE (OUTPATIENT)
Dept: INTERNAL MEDICINE CLINIC | Facility: CLINIC | Age: 44
End: 2024-11-27

## 2024-11-27 ENCOUNTER — OFFICE VISIT (OUTPATIENT)
Dept: INTERNAL MEDICINE CLINIC | Facility: CLINIC | Age: 44
End: 2024-11-27

## 2024-11-27 VITALS
HEIGHT: 64 IN | BODY MASS INDEX: 42.34 KG/M2 | DIASTOLIC BLOOD PRESSURE: 87 MMHG | HEART RATE: 74 BPM | OXYGEN SATURATION: 99 % | WEIGHT: 248 LBS | SYSTOLIC BLOOD PRESSURE: 135 MMHG

## 2024-11-27 DIAGNOSIS — L29.9 EAR ITCHING: ICD-10-CM

## 2024-11-27 PROCEDURE — 99213 OFFICE O/P EST LOW 20 MIN: CPT

## 2024-11-27 RX ORDER — ACETIC ACID 20.65 MG/ML
4 SOLUTION AURICULAR (OTIC) 3 TIMES DAILY
Qty: 15 ML | Refills: 0 | Status: SHIPPED | OUTPATIENT
Start: 2024-11-27

## 2024-11-27 RX ORDER — TIRZEPATIDE 7.5 MG/.5ML
7.5 INJECTION, SOLUTION SUBCUTANEOUS WEEKLY
Qty: 2 ML | Refills: 1 | Status: SHIPPED | OUTPATIENT
Start: 2024-11-27 | End: 2024-12-02

## 2024-11-27 NOTE — TELEPHONE ENCOUNTER
[id number]      Current Outpatient Medications:     ZEPBOUND 7.5 MG/0.5ML Subcutaneous Solution Auto-injector, Inject 7.5 mg into the skin once a week., Disp: 2 mL, Rfl: 1

## 2024-11-27 NOTE — PROGRESS NOTES
Subjective:   Kaitlin Elizalde is a 44 year old female who presents for Follow - Up     Presents for zepbound follow-up  Starting weight: 266lbs  Current weight: 248lbs  Interval weight loss: 11lbs in the past month     Feeling well overall  Having a BM every day but more trouble evacuating, firmer stools   Not currently taking a fiber supplement   One time took a dulcolax gummy which did help   No nausea     Was in immediate care Saturday for ear pain and sore throat   Improving but still itchiness in her left ear which is very bothersome  Was prescribed an antibiotic but did not start it     History/Other:    Chief Complaint Reviewed and Verified  No Further Nursing Notes to   Review  Tobacco Reviewed  Allergies Reviewed  Medications Reviewed  OB   Status Reviewed         Tobacco:  She has never smoked tobacco.    Current Outpatient Medications   Medication Sig Dispense Refill    ZEPBOUND 7.5 MG/0.5ML Subcutaneous Solution Auto-injector Inject 7.5 mg into the skin once a week. 2 mL 1    acetic acid 2 % Otic Solution Place 4 drops into the left ear 3 (three) times daily. 15 mL 0     Review of Systems:  Review of Systems  10 point review of systems otherwise negative with the exception of HPI and assessment and plan    Objective:   /87   Pulse 74   Ht 5' 4\" (1.626 m)   Wt 250 lb (113.4 kg)   LMP 11/09/2024 (Approximate)   SpO2 99%   BMI 42.91 kg/m²  Estimated body mass index is 42.91 kg/m² as calculated from the following:    Height as of this encounter: 5' 4\" (1.626 m).    Weight as of this encounter: 250 lb (113.4 kg).  Physical Exam  Vitals reviewed.   Constitutional:       General: She is not in acute distress.     Appearance: Normal appearance. She is well-developed.   HENT:      Right Ear: Tympanic membrane normal.      Left Ear: Tympanic membrane normal.      Mouth/Throat:      Mouth: Mucous membranes are moist.      Pharynx: Oropharynx is clear.   Cardiovascular:      Rate and Rhythm: Normal  rate and regular rhythm.      Heart sounds: Normal heart sounds.   Pulmonary:      Effort: Pulmonary effort is normal.      Breath sounds: Normal breath sounds.   Lymphadenopathy:      Cervical: No cervical adenopathy.   Skin:     General: Skin is warm and dry.   Neurological:      Mental Status: She is alert and oriented to person, place, and time.       Assessment & Plan:   1. BMI 45.0-49.9, adult (HCC) (Primary)  -     Zepbound; Inject 7.5 mg into the skin once a week.  Dispense: 2 mL; Refill: 1  Stay at same dose as she has good weight loss   2. Ear itching  -     Acetic Acid; Place 4 drops into the left ear 3 (three) times daily.  Dispense: 15 mL; Refill: 0      FABBY Monet, 11/27/2024, 9:58 AM

## 2024-11-29 NOTE — TELEPHONE ENCOUNTER
Prior Authorization History  ZEPBOUND 7.5 MG/0.5ML Subcutaneous Solution Auto-injector     History    View all authorizations for this medication  Closed   11/29/2024 10:48 AM  Close reason: Prior Authorization not required for patient/medication   Note from payer: Clinical Override not needed   Payer: [employer]    [phone]

## 2024-11-30 ENCOUNTER — PATIENT MESSAGE (OUTPATIENT)
Dept: INTERNAL MEDICINE CLINIC | Facility: CLINIC | Age: 44
End: 2024-11-30

## 2024-12-01 NOTE — TELEPHONE ENCOUNTER
November 30, 2024  Kaitlin Elizalde to P Em Rn Triage (supporting FABBY Monet)         11/30/24 10:20 AM  Hello. Happy Thanksgiving! I spoke with [facility] regarding the Zepbound that was supposed to be filled. They stated insurance is requesting authorization from MDs office. Can you please review? Thanks!

## 2024-12-02 RX ORDER — TIRZEPATIDE 10 MG/.5ML
10 INJECTION, SOLUTION SUBCUTANEOUS WEEKLY
Qty: 2 ML | Refills: 0 | Status: SHIPPED | OUTPATIENT
Start: 2024-12-02 | End: 2024-12-24

## 2024-12-02 NOTE — TELEPHONE ENCOUNTER
Yes, Zepbound already approved until 05/04/2025 which includes all doses.  Maintenance doses are 5mg, 10mg or 15 mg.   Patient received Zepbound 7.5mg on 10/30/2024.  Due for next dose, order pended. Please review and advise.

## 2024-12-03 RX ORDER — TIRZEPATIDE 7.5 MG/.5ML
7.5 INJECTION, SOLUTION SUBCUTANEOUS WEEKLY
Qty: 2 ML | Refills: 1 | OUTPATIENT
Start: 2024-12-03

## 2024-12-03 NOTE — TELEPHONE ENCOUNTER
Per prior authorization telephone encounter 11/27/24:    Insurance rejected a \"1/2\" dose, as this is not maintenance. Zepbound 10 mg sent to patient's [facility] 12/2/24.    No further actin required.

## 2024-12-27 NOTE — TELEPHONE ENCOUNTER
Please review; protocol failed/No Protocol    -Which GLP is the patient on: Zepbound    -Current dose: 10mg    -Patient seeking refill or increase to next dose: Refill    -How many doses of current dose completed: 1 month    -Side effects, if any:    -Current weight / how much weight loss: 248 pounds/18 pounds    -Last visit: 11/27/2024    Requested Prescriptions   Pending Prescriptions Disp Refills    Tirzepatide-Weight Management (ZEPBOUND) 10 MG/0.5ML Subcutaneous Solution Auto-injector 2 mL 0     Sig: Inject 10 mg into the skin once a week for 4 doses.       There is no refill protocol information for this order        Future Appointments         Provider Department Appt Notes    In 1 month Melia Singer APRN [facility] follow [facility]          Recent Outpatient Visits              1 month ago BMI 45.0-49.9, adult ([facility]    Office Visit    1 month ago BMI 45.0-49.9, adult ([facility]    Office Visit    2 months ago BMI 45.0-49.9, adult ([facility]    Office Visit    3 months ago Routine general medical examination at a health care facility    [facility] Melia Singer APRN    Office Visit    5 months ago Posterior tibial tendinitis, left    [facility], Patricia Aguirre DPM    Office Visit

## 2024-12-27 NOTE — TELEPHONE ENCOUNTER
-Which GLP is the patient on: Zepbound    -Current dose: 10mg    -Patient seeking refill or increase to next dose: Refill    -How many doses of current dose completed: 1 month    -Side effects, if any:    -Current weight / how much weight loss: 248 pounds/18 pounds    -Last visit: 11/27/2024

## 2024-12-29 RX ORDER — TIRZEPATIDE 10 MG/.5ML
10 INJECTION, SOLUTION SUBCUTANEOUS WEEKLY
Qty: 2 ML | Refills: 0 | Status: SHIPPED | OUTPATIENT
Start: 2024-12-29 | End: 2025-01-20

## 2024-12-29 NOTE — TELEPHONE ENCOUNTER
Per mychart sent - patient now out.         Hello! Hope you enjoyed the holidays! I sent a request for a refill on Zepbound on 12/22. I haven't received any updates and wondering if the insurance approved for the same dose? I took the last injection last week Sunday and was hoping I could  before the weekend was over. I know it's been busy with the end of the year and holidays, but I wanted to make sure I keep on track with my weekly injections. Let me know if there is any info you need from me. Thanks!

## 2025-01-27 ENCOUNTER — OFFICE VISIT (OUTPATIENT)
Dept: INTERNAL MEDICINE CLINIC | Facility: CLINIC | Age: 45
End: 2025-01-27
Payer: COMMERCIAL

## 2025-01-27 VITALS
SYSTOLIC BLOOD PRESSURE: 119 MMHG | WEIGHT: 235 LBS | OXYGEN SATURATION: 98 % | HEIGHT: 64 IN | HEART RATE: 77 BPM | BODY MASS INDEX: 40.12 KG/M2 | DIASTOLIC BLOOD PRESSURE: 83 MMHG

## 2025-01-27 DIAGNOSIS — M25.512 ACUTE PAIN OF LEFT SHOULDER: ICD-10-CM

## 2025-01-27 DIAGNOSIS — M25.562 ACUTE PAIN OF BOTH KNEES: ICD-10-CM

## 2025-01-27 DIAGNOSIS — M25.561 ACUTE PAIN OF BOTH KNEES: ICD-10-CM

## 2025-01-27 PROCEDURE — 99214 OFFICE O/P EST MOD 30 MIN: CPT

## 2025-01-27 RX ORDER — TIRZEPATIDE 12.5 MG/.5ML
12.5 INJECTION, SOLUTION SUBCUTANEOUS WEEKLY
Qty: 2 ML | Refills: 1 | Status: SHIPPED | OUTPATIENT
Start: 2025-01-27 | End: 2025-01-27

## 2025-01-27 RX ORDER — TIRZEPATIDE 10 MG/.5ML
10 INJECTION, SOLUTION SUBCUTANEOUS WEEKLY
Qty: 2 ML | Refills: 0 | Status: SHIPPED | OUTPATIENT
Start: 2025-01-27 | End: 2025-02-18

## 2025-01-27 NOTE — PROGRESS NOTES
Subjective:   Kaitlin Elizalde is a 44 year old female who presents for Follow - Up     Follow up on zepbound  Starting weight: 266lbs  Current weight: 235lbs  Interval weight loss: 13lbs in 2 months    No constipation or nausea   Cravings are not as bad, not as hungry as she used to be   Eating smaller portions  Sometimes forgets to eat, but still trying to eat 3 times a day     Is noticing more joint pain especially on the left shoulder   Sleeps on her left side and the pain is worse at night when she is laying down  Uncomfortable when she stretches her arm across her body   No injuries she can recall   Has had chronic knee pain which is getting worse bilaterally  No knee swelling     Has been exercising more, mostly at home, squats and home workout     History/Other:    Chief Complaint Reviewed and Verified  No Further Nursing Notes to   Review  Tobacco Reviewed  Allergies Reviewed  Medications Reviewed    Problem List Reviewed  Medical History Reviewed  Surgical History   Reviewed  OB Status Reviewed  Family History Reviewed         Tobacco:  She has never smoked tobacco.    Current Outpatient Medications   Medication Sig Dispense Refill    Tirzepatide-Weight Management (ZEPBOUND) 10 MG/0.5ML Subcutaneous Solution Auto-injector Inject 10 mg into the skin once a week for 4 doses. 2 mL 0    acetic acid 2 % Otic Solution Place 4 drops into the left ear 3 (three) times daily. (Patient not taking: Reported on 1/27/2025) 15 mL 0     Review of Systems:  Review of Systems  10 point review of systems otherwise negative with the exception of HPI and assessment and plan    Objective:   /83   Pulse 77   Ht 5' 4\" (1.626 m)   Wt 235 lb (106.6 kg)   LMP 01/05/2025 (Approximate)   SpO2 98%   BMI 40.34 kg/m²  Estimated body mass index is 40.34 kg/m² as calculated from the following:    Height as of this encounter: 5' 4\" (1.626 m).    Weight as of this encounter: 235 lb (106.6 kg).  Physical Exam  Vitals  reviewed.   Constitutional:       General: She is not in acute distress.     Appearance: Normal appearance. She is well-developed.   Cardiovascular:      Rate and Rhythm: Normal rate and regular rhythm.      Heart sounds: Normal heart sounds.   Pulmonary:      Effort: Pulmonary effort is normal.      Breath sounds: Normal breath sounds.   Abdominal:      General: Bowel sounds are normal.      Palpations: Abdomen is soft.   Musculoskeletal:      Left shoulder: No bony tenderness. Normal range of motion. Normal strength.      Right knee: Crepitus present. No effusion. Normal range of motion.      Left knee: Crepitus present. No effusion. Normal range of motion.   Lymphadenopathy:      Cervical: No cervical adenopathy.   Skin:     General: Skin is warm and dry.   Neurological:      Mental Status: She is alert and oriented to person, place, and time.         Assessment & Plan:   1. BMI 45.0-49.9, adult (HCC) (Primary)  -     Zepbound; Inject 10 mg into the skin once a week for 4 doses.  Dispense: 2 mL; Refill: 0  Requests same dose for next month and then consider increasing dose  2. Acute pain of left shoulder  Possible impingement pain  Provided shoulder rehab exercises to try at home   3. Acute pain of both knees  Avoid deep squatting exercises   Provided knee rehab exercises as she would prefer to try at home prior to any formal physical therapy     FABBY Monet, 1/27/2025, 8:06 AM

## 2025-01-28 ENCOUNTER — PATIENT MESSAGE (OUTPATIENT)
Dept: INTERNAL MEDICINE CLINIC | Facility: CLINIC | Age: 45
End: 2025-01-28

## 2025-01-29 RX ORDER — TIRZEPATIDE 10 MG/.5ML
10 INJECTION, SOLUTION SUBCUTANEOUS WEEKLY
Qty: 2 ML | Refills: 0 | Status: SHIPPED | OUTPATIENT
Start: 2025-01-29 | End: 2025-02-20

## 2025-01-29 NOTE — TELEPHONE ENCOUNTER
Melia, medication is not covered by insurance plan.     Prior Authorization History  Tirzepatide-Weight Management (ZEPBOUND) 10 MG/0.5ML Subcutaneous Solution Auto-injector     History    View all authorizations for this medication  Closed   1/28/2025  8:43 PM  Close reason: Other   Note from payer: Drug is not covered by plan   Payer: [employer]    [phone]

## 2025-01-29 NOTE — TELEPHONE ENCOUNTER
Patient called for update on prior auth, she is [employer] employee with [employer] plan.   Explained to the patient as of this year these medications can only be filled at [facility].   Prior auth will be automatically denied for any other pharmacy.   Patient states she prefers [facility] then.   Please send in new Rx and new prior auth can be completed.

## 2025-01-30 ENCOUNTER — TELEPHONE (OUTPATIENT)
Dept: INTERNAL MEDICINE CLINIC | Facility: CLINIC | Age: 45
End: 2025-01-30

## 2025-01-30 NOTE — TELEPHONE ENCOUNTER
Zepbound approved - sent [email or web address] message to inform  Approval Details    Authorized from January 1, 2025 to September 27, 2025

## 2025-01-30 NOTE — TELEPHONE ENCOUNTER
Kaitlin Elizalde to P Em Rn Triage (supporting FABBY Monet)   GS      1/30/25 10:57 AM  Hello! My prescription was changed to [facility]. They stated they are pending approval. Can you please provide status? Thanks!

## 2025-01-31 NOTE — TELEPHONE ENCOUNTER
Prior Authorization History  Tirzepatide-Weight Management (ZEPBOUND) 10 MG/0.5ML Subcutaneous Solution Auto-injector     Approval Details    Authorized from January 1, 2025 to September 27, 2025  Information received electronically from payer     History    View all authorizations for this medication  Approved   1/30/2025 11:45 AM  Appeal supported: No   Note from payer: CaseId:[id number];Status:Approved;Review Type:Prior Auth;Coverage Start Date:01/01/2025;Coverage End Date:09/27/2025;   Payer: [employer] Case ID: [id number]    [phone]    [phone]  Waiting for Payer Response   1/30/2025 11:44 AM

## 2025-03-19 ENCOUNTER — OFFICE VISIT (OUTPATIENT)
Dept: INTERNAL MEDICINE CLINIC | Facility: CLINIC | Age: 45
End: 2025-03-19
Payer: COMMERCIAL

## 2025-03-19 VITALS
HEIGHT: 64 IN | WEIGHT: 225 LBS | SYSTOLIC BLOOD PRESSURE: 111 MMHG | BODY MASS INDEX: 38.41 KG/M2 | HEART RATE: 85 BPM | OXYGEN SATURATION: 98 % | DIASTOLIC BLOOD PRESSURE: 76 MMHG

## 2025-03-19 PROCEDURE — 99213 OFFICE O/P EST LOW 20 MIN: CPT

## 2025-03-19 RX ORDER — TIRZEPATIDE 12.5 MG/.5ML
12.5 INJECTION, SOLUTION SUBCUTANEOUS WEEKLY
Qty: 2 ML | Refills: 0 | Status: SHIPPED | OUTPATIENT
Start: 2025-03-19

## 2025-03-19 RX ORDER — TIRZEPATIDE 12.5 MG/.5ML
INJECTION, SOLUTION SUBCUTANEOUS
COMMUNITY
Start: 2025-02-03 | End: 2025-03-19

## 2025-03-19 NOTE — PROGRESS NOTES
Subjective:   Kaitlin Elizalde is a 44 year old female who presents for Follow - Up     Follow up on zepbound  Starting weight: 266lbs   Current weight: 225lbs    Taking her last dose this week of the 12.5 mg  A little more active now with the nice weather, taking more stairs   No cravings, appetite is controlled   No side effects, not constipated    History/Other:    Chief Complaint Reviewed and Verified  No Further Nursing Notes to   Review  Tobacco Reviewed  Allergies Reviewed  Medications Reviewed    Problem List Reviewed  Medical History Reviewed  Surgical History   Reviewed  OB Status Reviewed  Family History Reviewed         Tobacco:  She has never smoked tobacco.    Current Outpatient Medications   Medication Sig Dispense Refill    ZEPBOUND 12.5 MG/0.5ML Subcutaneous Solution Auto-injector Inject 12.5 mg into the skin once a week. 2 mL 0         Review of Systems:  Review of Systems  10 point review of systems otherwise negative with the exception of HPI and assessment and plan    Objective:   /76   Pulse 85   Ht 5' 4\" (1.626 m)   Wt 225 lb (102.1 kg)   LMP 03/02/2025 (Approximate)   SpO2 98%   BMI 38.62 kg/m²  Estimated body mass index is 38.62 kg/m² as calculated from the following:    Height as of this encounter: 5' 4\" (1.626 m).    Weight as of this encounter: 225 lb (102.1 kg).  Physical Exam  Vitals reviewed.   Constitutional:       General: She is not in acute distress.     Appearance: Normal appearance. She is well-developed.   Cardiovascular:      Rate and Rhythm: Normal rate and regular rhythm.      Heart sounds: Normal heart sounds.   Pulmonary:      Effort: Pulmonary effort is normal.      Breath sounds: Normal breath sounds.   Abdominal:      General: Bowel sounds are normal.      Palpations: Abdomen is soft.   Skin:     General: Skin is warm and dry.   Neurological:      Mental Status: She is alert and oriented to person, place, and time.           Assessment & Plan:   1.  BMI 45.0-49.9, adult (HCC) (Primary)  -     Zepbound; Inject 12.5 mg into the skin once a week.  Dispense: 2 mL; Refill: 0      FABBY Monet, 3/19/2025, 9:24 AM

## 2025-04-18 ENCOUNTER — OFFICE VISIT (OUTPATIENT)
Dept: INTERNAL MEDICINE CLINIC | Facility: CLINIC | Age: 45
End: 2025-04-18
Payer: COMMERCIAL

## 2025-04-18 VITALS
BODY MASS INDEX: 37.22 KG/M2 | OXYGEN SATURATION: 99 % | HEIGHT: 64 IN | HEART RATE: 78 BPM | WEIGHT: 218 LBS | DIASTOLIC BLOOD PRESSURE: 73 MMHG | SYSTOLIC BLOOD PRESSURE: 107 MMHG

## 2025-04-18 DIAGNOSIS — L65.9 HAIR THINNING: ICD-10-CM

## 2025-04-18 PROCEDURE — 99213 OFFICE O/P EST LOW 20 MIN: CPT

## 2025-04-18 RX ORDER — TIRZEPATIDE 15 MG/.5ML
15 INJECTION, SOLUTION SUBCUTANEOUS WEEKLY
Qty: 2 ML | Refills: 3 | Status: SHIPPED | OUTPATIENT
Start: 2025-04-18 | End: 2025-05-10

## 2025-04-18 NOTE — PROGRESS NOTES
Subjective:   Kaitlin Elizalde is a 44 year old female who presents for Follow - Up (F/u medication)     Presents for follow up on zepbound  Starting weight: 266lbs   Current weight: 218lbs  Interval weight loss: 7lbs     No side effects, no constipation or nausea   Works three jobs but has been outside and walking more than usual, taking the stairs   Cravings are controlled   Sometimes misses dinner because she is so tired from work but usually eats 3 meals a day    Also noticed overall hair thinning, had noticed this before being on zepbound but it has increased recently     History/Other:    Chief Complaint Reviewed and Verified  Nursing Notes Reviewed and   Verified  Tobacco Reviewed  Allergies Reviewed  Medications Reviewed    OB Status Reviewed         Tobacco:  She has never smoked tobacco.    Current Medications[1]    Review of Systems:  Review of Systems  10 point review of systems otherwise negative with the exception of HPI and assessment and plan    Objective:   /73   Pulse 78   Ht 5' 4\" (1.626 m)   Wt 218 lb (98.9 kg)   LMP 03/29/2025 (Approximate)   SpO2 99%   BMI 37.42 kg/m²  Estimated body mass index is 37.42 kg/m² as calculated from the following:    Height as of this encounter: 5' 4\" (1.626 m).    Weight as of this encounter: 218 lb (98.9 kg).  Physical Exam  Vitals reviewed.   Constitutional:       General: She is not in acute distress.     Appearance: Normal appearance. She is well-developed.   Cardiovascular:      Rate and Rhythm: Normal rate and regular rhythm.      Heart sounds: Normal heart sounds.   Pulmonary:      Effort: Pulmonary effort is normal.      Breath sounds: Normal breath sounds.   Abdominal:      General: Bowel sounds are normal.      Palpations: Abdomen is soft.   Skin:     General: Skin is warm and dry.   Neurological:      Mental Status: She is alert and oriented to person, place, and time.       Assessment & Plan:   1. BMI 45.0-49.9, adult (HCC) (Primary)  -      Zepbound; Inject 15 mg into the skin once a week for 4 doses.  Dispense: 2 mL; Refill: 3  Will increase to the maximum maintenance dose and provide refills   F/u as needed or in approx 3 months   2. Hair thinning  -     TSH W Reflex To Free T4; Future; Expected date: 04/18/2025  -     Ferritin; Future; Expected date: 04/18/2025  -     Vitamin D; Future; Expected date: 04/18/2025      FABBY Monet, 4/18/2025, 4:17 PM        [1]   Current Outpatient Medications   Medication Sig Dispense Refill    Tirzepatide-Weight Management (ZEPBOUND) 15 MG/0.5ML Subcutaneous Solution Auto-injector Inject 15 mg into the skin once a week for 4 doses. 2 mL 3

## 2025-04-21 ENCOUNTER — LAB ENCOUNTER (OUTPATIENT)
Dept: LAB | Facility: HOSPITAL | Age: 45
End: 2025-04-21
Payer: COMMERCIAL

## 2025-04-21 DIAGNOSIS — L65.9 HAIR THINNING: ICD-10-CM

## 2025-04-21 DIAGNOSIS — E78.2 MIXED HYPERLIPIDEMIA: ICD-10-CM

## 2025-04-21 DIAGNOSIS — D72.820 LYMPHOCYTOSIS: ICD-10-CM

## 2025-04-21 DIAGNOSIS — R73.01 ELEVATED FASTING BLOOD SUGAR: ICD-10-CM

## 2025-04-21 LAB
BASOPHILS # BLD AUTO: 0.05 X10(3) UL (ref 0–0.2)
BASOPHILS NFR BLD AUTO: 0.7 %
CHOLEST SERPL-MCNC: 227 MG/DL (ref ?–200)
DEPRECATED HBV CORE AB SER IA-ACNC: 26 NG/ML (ref 50–306)
DEPRECATED RDW RBC AUTO: 43.6 FL (ref 35.1–46.3)
EOSINOPHIL # BLD AUTO: 0.15 X10(3) UL (ref 0–0.7)
EOSINOPHIL NFR BLD AUTO: 2.1 %
ERYTHROCYTE [DISTWIDTH] IN BLOOD BY AUTOMATED COUNT: 12.9 % (ref 11–15)
EST. AVERAGE GLUCOSE BLD GHB EST-MCNC: 97 MG/DL (ref 68–126)
FASTING PATIENT LIPID ANSWER: YES
HBA1C MFR BLD: 5 % (ref ?–5.7)
HCT VFR BLD AUTO: 43.2 % (ref 35–48)
HDLC SERPL-MCNC: 51 MG/DL (ref 40–59)
HGB BLD-MCNC: 14.7 G/DL (ref 12–16)
IMM GRANULOCYTES # BLD AUTO: 0.02 X10(3) UL (ref 0–1)
IMM GRANULOCYTES NFR BLD: 0.3 %
LDLC SERPL CALC-MCNC: 154 MG/DL (ref ?–100)
LYMPHOCYTES # BLD AUTO: 3.38 X10(3) UL (ref 1–4)
LYMPHOCYTES NFR BLD AUTO: 47.1 %
MCH RBC QN AUTO: 31.3 PG (ref 26–34)
MCHC RBC AUTO-ENTMCNC: 34 G/DL (ref 31–37)
MCV RBC AUTO: 91.9 FL (ref 80–100)
MONOCYTES # BLD AUTO: 0.51 X10(3) UL (ref 0.1–1)
MONOCYTES NFR BLD AUTO: 7.1 %
NEUTROPHILS # BLD AUTO: 3.07 X10 (3) UL (ref 1.5–7.7)
NEUTROPHILS # BLD AUTO: 3.07 X10(3) UL (ref 1.5–7.7)
NEUTROPHILS NFR BLD AUTO: 42.7 %
NONHDLC SERPL-MCNC: 176 MG/DL (ref ?–130)
PLATELET # BLD AUTO: 269 10(3)UL (ref 150–450)
RBC # BLD AUTO: 4.7 X10(6)UL (ref 3.8–5.3)
TRIGL SERPL-MCNC: 122 MG/DL (ref 30–149)
TSI SER-ACNC: 0.85 UIU/ML (ref 0.55–4.78)
VIT D+METAB SERPL-MCNC: 31.3 NG/ML (ref 30–100)
VLDLC SERPL CALC-MCNC: 23 MG/DL (ref 0–30)
WBC # BLD AUTO: 7.2 X10(3) UL (ref 4–11)

## 2025-04-21 PROCEDURE — 84443 ASSAY THYROID STIM HORMONE: CPT

## 2025-04-21 PROCEDURE — 80061 LIPID PANEL: CPT

## 2025-04-21 PROCEDURE — 82728 ASSAY OF FERRITIN: CPT

## 2025-04-21 PROCEDURE — 85025 COMPLETE CBC W/AUTO DIFF WBC: CPT

## 2025-04-21 PROCEDURE — 82306 VITAMIN D 25 HYDROXY: CPT

## 2025-04-21 PROCEDURE — 36415 COLL VENOUS BLD VENIPUNCTURE: CPT

## 2025-04-21 PROCEDURE — 83036 HEMOGLOBIN GLYCOSYLATED A1C: CPT

## 2025-06-11 NOTE — DISCHARGE INSTRUCTIONS
Make sure to stay well hydrated with clear fluids. You can use Tylenol or Motrin every 6 hours to control fever or discomfort. You can use both Tylenol and Motrin, but alternate them so that you're getting one every 3 hours. Warm salt water gargles, throat lozenges, and warmed beverages can be additionally helpful for a sore throat. Using a humidifier in the bedroom at night, and sleeping propped up on pillows/somewhat of an incline can also be helpful. Cover your cough and wash your hands frequently to prevent the spread of infection. Follow up with your primary care provider in the next 2-3 days. Seek additional care in the ER for fever that is not controlled with Tylenol and Motrin, difficulty breathing or shortness of breath, chest pain, or any new/worsening symptoms.    [EKG obtained to assist in diagnosis and management of assessed problem(s)] : EKG obtained to assist in diagnosis and management of assessed problem(s)